# Patient Record
Sex: MALE
[De-identification: names, ages, dates, MRNs, and addresses within clinical notes are randomized per-mention and may not be internally consistent; named-entity substitution may affect disease eponyms.]

---

## 2024-06-14 PROBLEM — Z00.00 ENCOUNTER FOR PREVENTIVE HEALTH EXAMINATION: Status: ACTIVE | Noted: 2024-06-14

## 2024-07-10 ENCOUNTER — NON-APPOINTMENT (OUTPATIENT)
Age: 79
End: 2024-07-10

## 2024-07-11 ENCOUNTER — APPOINTMENT (OUTPATIENT)
Dept: NEUROSURGERY | Facility: CLINIC | Age: 79
End: 2024-07-11

## 2024-07-11 VITALS
WEIGHT: 156 LBS | HEIGHT: 67 IN | OXYGEN SATURATION: 98 % | DIASTOLIC BLOOD PRESSURE: 86 MMHG | BODY MASS INDEX: 24.48 KG/M2 | HEART RATE: 80 BPM | SYSTOLIC BLOOD PRESSURE: 142 MMHG

## 2024-07-11 DIAGNOSIS — M43.16 SPONDYLOLISTHESIS, LUMBAR REGION: ICD-10-CM

## 2024-07-11 DIAGNOSIS — M47.27 OTHER SPONDYLOSIS WITH RADICULOPATHY, LUMBOSACRAL REGION: ICD-10-CM

## 2024-07-11 DIAGNOSIS — M54.12 RADICULOPATHY, CERVICAL REGION: ICD-10-CM

## 2024-07-11 DIAGNOSIS — M54.16 RADICULOPATHY, LUMBAR REGION: ICD-10-CM

## 2024-07-11 DIAGNOSIS — M48.062 SPINAL STENOSIS, LUMBAR REGION WITH NEUROGENIC CLAUDICATION: ICD-10-CM

## 2024-07-11 PROCEDURE — 99205 OFFICE O/P NEW HI 60 MIN: CPT

## 2024-07-12 PROBLEM — M48.062 NEUROGENIC CLAUDICATION DUE TO LUMBAR SPINAL STENOSIS: Status: ACTIVE | Noted: 2024-07-12

## 2024-08-04 NOTE — PHYSICAL EXAM
[General Appearance - Alert] : alert [General Appearance - In No Acute Distress] : in no acute distress [Fluency] : fluency intact [Comprehension] : comprehension intact [Cranial Nerves Optic (II)] : visual acuity intact bilaterally,  pupils equal round and reactive to light [Cranial Nerves Oculomotor (III)] : extraocular motion intact [Cranial Nerves Trigeminal (V)] : facial sensation intact symmetrically [Cranial Nerves Facial (VII)] : face symmetrical [Cranial Nerves Vestibulocochlear (VIII)] : hearing was intact bilaterally [Cranial Nerves Glossopharyngeal (IX)] : tongue and palate midline [Cranial Nerves Hypoglossal (XII)] : there was no tongue deviation with protrusion [Cranial Nerves Accessory (XI - Cranial And Spinal)] : head turning and shoulder shrug symmetric [Motor Strength] : muscle strength was normal in all four extremities [Sensation Tactile Decrease] : light touch was intact [Abnormal Walk] : normal gait [2+] : Ankle jerk left 2+

## 2024-08-08 ENCOUNTER — APPOINTMENT (OUTPATIENT)
Dept: NEUROSURGERY | Facility: CLINIC | Age: 79
End: 2024-08-08

## 2024-08-08 NOTE — HISTORY OF PRESENT ILLNESS
[de-identified] : French Jensen presents for interval follow up and imaging review for lumbar stenosis. He continues to have pain from gluteal area down posteriorly to the knee. He has participated in PT with *** relief.   He underwent CT lumbar spine demonstrating L4-L5 moderate broad based disc herniation with mild to moderate spinal canal stenosis. L5-S1 there is a moderate broad based disc herniation with mild spinal canal stenosis (see detailed report below). He underwent lumbar x-rays with no acute pathology identified.   7/11/24: FRENCH JENSEN is a 78 year old male with a PMH of non obstructive CAD, hypertension, hyperlipidemia, osteoporosis who presents to the office today for neurosurgical consultation due to lumbar stenosis.   The pain is characterized as sharp worse at night.  It started six to eight months ago progressively worse. It is exacerbated by bending and relieved by standing, some medications.  Limits his ADLs including walking to mailbox. It radiates from gluteal area posteriorly down to the knee. Has received injections in knees. Has bakers cyst on knees. There has been no known trauma precipitating the pain.  The patient denies numbness of extremities, weakness of extremities, bowel or bladder incontinence and gait disturbance.  He has tried APRIL (March 14 with Dr. Ricardo Guerra No relief) Aleve 5-6 times per day, and pain medications including Tramadol, Celebrex in the last months without relief.  He has undergone imaging in the form of MRI which revealed severe multilevel degenerative disc and facet joint changes. Disc bulges and marginal osteophytes from L1-L2 to L5-S1 causing central canal stenosis or neural foraminal narrowing. There is no disc herniation in the lumbar spine. Grade 1 degenerative spondylolisthesis at L1-L2 and L4-L5. Levoscoliosis which contributes to neural foraminal narrowing at multiple levels (see detailed report below)  Cardiologist: Dr. DARIEL MCLAIN [facility]  Surg Hx:  splenectomy, cardiac catheterization, Tonsillectomy  Meds:  Aspirin 81 mg, Atorvastatin, Atenolol 100 mg, Gabapentin 600 mg TID, Lisinopril 2.5 mg, MVI, B12  Allergies: NKDA   Soc Hx: smokes marijuana daily, Former smoker quit age 44 1 PPD, social EtOH 3-4 beers per day, lives with wife, worked previously in detail shop and fur industry

## 2024-08-08 NOTE — ASSESSMENT
[FreeTextEntry1] : EDY KERR is a 78 year old male with a PMH of non obstructive CAD, PAD, hypertension, hyperlipidemia who presents to the office today for follow up due to lumbar stenosis.   I reviewed his CT and x-ray lumbar spine in the office today.  Previous and again today, I reviewed his MRI lumbar spine using the spine model to aid in interpretation. He has evidence of central and neuroforaminal stenosis secondary to lumbar spondylosis with degenerative lumbar spondylolisthesis at L4-5.   I have discussed the natural history and treatment options for neurogenic claudication due to lumbar spinal stenosis with the patient. I explained the indications for observation, conservative management, medical management, physical therapy, pain management approaches and surgery. I explained the different types and surgical approaches including anterior and posterior approaches as well as decompression only procedures and instrumented fusions. I discussed the risks, benefits, possible complications and expected outcome related to each treatment option. The risks of surgery were discussed in detail including but not limited to postoperative infection at the surgical site, hospital acquired pneumonia, hospital acquired urinary tract infection, postoperative meningitis, wound dehiscence, CSF leak, stroke (ischemic and hemorrhagic), postoperative seizures, worsening motor function due to spinal cord or nerve injury, postoperative visual deficit which could be permanent (blindness) when surgery is performed in a prone position, cardiovascular complications (MI, PE, DVT) and I also explained that some of these complications could lead to sepsis, coma or even death.  In the end I recommend surgical intervention in the form of ***.  The patient will need cardiac clearance and will need NP clearance with the Presurgical Testing Department at [place] prior to the procedure.  At the end of the discussion, the patient opted to move forward with the above plan.  Our office will reach out to coordinate a surgical date in the coming weeks.  The patient understands the plan of care and is in agreement.  All questions answered to patient satisfaction.  I have spent 45 minutes relative to this encounter, including >50% of this time being with the patient in the office.

## 2024-08-08 NOTE — ASSESSMENT
[FreeTextEntry1] : EDY KERR is a 78 year old male with a PMH of non obstructive CAD, PAD, hypertension, hyperlipidemia who presents to the office today for follow up due to lumbar stenosis.   I reviewed his CT and x-ray lumbar spine in the office today.  Previous and again today, I reviewed his MRI lumbar spine using the spine model to aid in interpretation. He has evidence of central and neuroforaminal stenosis secondary to lumbar spondylosis with degenerative lumbar spondylolisthesis at L4-5.   I have discussed the natural history and treatment options for neurogenic claudication due to lumbar spinal stenosis with the patient. I explained the indications for observation, conservative management, medical management, physical therapy, pain management approaches and surgery. I explained the different types and surgical approaches including anterior and posterior approaches as well as decompression only procedures and instrumented fusions. I discussed the risks, benefits, possible complications and expected outcome related to each treatment option. The risks of surgery were discussed in detail including but not limited to postoperative infection at the surgical site, hospital acquired pneumonia, hospital acquired urinary tract infection, postoperative meningitis, wound dehiscence, CSF leak, stroke (ischemic and hemorrhagic), postoperative seizures, worsening motor function due to spinal cord or nerve injury, postoperative visual deficit which could be permanent (blindness) when surgery is performed in a prone position, cardiovascular complications (MI, PE, DVT) and I also explained that some of these complications could lead to sepsis, coma or even death.  In the end I recommend surgical intervention in the form of ***.  The patient will need cardiac clearance and will need NP clearance with the Presurgical Testing Department at [place] prior to the procedure.  At the end of the discussion, the patient opted to move forward with the above plan.  Our office will reach out to coordinate a surgical date in the coming weeks.  The patient understands the plan of care and is in agreement.  All questions answered to patient satisfaction.  I have spent 45 minutes relative to this encounter, including >50% of this time being with the patient in the office.

## 2024-08-08 NOTE — ASSESSMENT
[FreeTextEntry1] : EDY KERR is a 78 year old male with a PMH of non obstructive CAD, PAD, hypertension, hyperlipidemia who presents to the office today for follow up due to lumbar stenosis.   I reviewed his CT and x-ray lumbar spine in the office today.  Previous and again today, I reviewed his MRI lumbar spine using the spine model to aid in interpretation. He has evidence of central and neuroforaminal stenosis secondary to lumbar spondylosis with degenerative lumbar spondylolisthesis at L4-5.   I have discussed the natural history and treatment options for neurogenic claudication due to lumbar spinal stenosis with the patient. I explained the indications for observation, conservative management, medical management, physical therapy, pain management approaches and surgery. I explained the different types and surgical approaches including anterior and posterior approaches as well as decompression only procedures and instrumented fusions. I discussed the risks, benefits, possible complications and expected outcome related to each treatment option. The risks of surgery were discussed in detail including but not limited to postoperative infection at the surgical site, hospital acquired pneumonia, hospital acquired urinary tract infection, postoperative meningitis, wound dehiscence, CSF leak, stroke (ischemic and hemorrhagic), postoperative seizures, worsening motor function due to spinal cord or nerve injury, postoperative visual deficit which could be permanent (blindness) when surgery is performed in a prone position, cardiovascular complications (MI, PE, DVT) and I also explained that some of these complications could lead to sepsis, coma or even death.  In the end I recommend surgical intervention in the form of ***.  The patient will need cardiac clearance and will need NP clearance with the Presurgical Testing Department at [place] prior to the procedure.  At the end of the discussion, the patient opted to move forward with the above plan.  Our office will reach out to coordinate a surgical date in the coming weeks.  The patient understands the plan of care and is in agreement.  All questions answered to patient satisfaction.  I have spent 45 minutes relative to this encounter, including >50% of this time being with the patient in the office.

## 2024-08-08 NOTE — HISTORY OF PRESENT ILLNESS
[de-identified] : French Jensen presents for interval follow up and imaging review for lumbar stenosis. He continues to have pain from gluteal area down posteriorly to the knee. He has participated in PT with *** relief.   He underwent CT lumbar spine demonstrating L4-L5 moderate broad based disc herniation with mild to moderate spinal canal stenosis. L5-S1 there is a moderate broad based disc herniation with mild spinal canal stenosis (see detailed report below). He underwent lumbar x-rays with no acute pathology identified.   7/11/24: FRENCH JENSEN is a 78 year old male with a PMH of non obstructive CAD, hypertension, hyperlipidemia, osteoporosis who presents to the office today for neurosurgical consultation due to lumbar stenosis.   The pain is characterized as sharp worse at night.  It started six to eight months ago progressively worse. It is exacerbated by bending and relieved by standing, some medications.  Limits his ADLs including walking to mailbox. It radiates from gluteal area posteriorly down to the knee. Has received injections in knees. Has bakers cyst on knees. There has been no known trauma precipitating the pain.  The patient denies numbness of extremities, weakness of extremities, bowel or bladder incontinence and gait disturbance.  He has tried APRIL (March 14 with Dr. Ricardo Guerra No relief) Aleve 5-6 times per day, and pain medications including Tramadol, Celebrex in the last months without relief.  He has undergone imaging in the form of MRI which revealed severe multilevel degenerative disc and facet joint changes. Disc bulges and marginal osteophytes from L1-L2 to L5-S1 causing central canal stenosis or neural foraminal narrowing. There is no disc herniation in the lumbar spine. Grade 1 degenerative spondylolisthesis at L1-L2 and L4-L5. Levoscoliosis which contributes to neural foraminal narrowing at multiple levels (see detailed report below)  Cardiologist: Dr. DARIEL MCLAIN [facility]  Surg Hx:  splenectomy, cardiac catheterization, Tonsillectomy  Meds:  Aspirin 81 mg, Atorvastatin, Atenolol 100 mg, Gabapentin 600 mg TID, Lisinopril 2.5 mg, MVI, B12  Allergies: NKDA   Soc Hx: smokes marijuana daily, Former smoker quit age 44 1 PPD, social EtOH 3-4 beers per day, lives with wife, worked previously in detail shop and fur industry

## 2024-08-08 NOTE — HISTORY OF PRESENT ILLNESS
[de-identified] : French Jensen presents for interval follow up and imaging review for lumbar stenosis. He continues to have pain from gluteal area down posteriorly to the knee. He has participated in PT with *** relief.   He underwent CT lumbar spine demonstrating L4-L5 moderate broad based disc herniation with mild to moderate spinal canal stenosis. L5-S1 there is a moderate broad based disc herniation with mild spinal canal stenosis (see detailed report below). He underwent lumbar x-rays with no acute pathology identified.   7/11/24: FRENCH JENSEN is a 78 year old male with a PMH of non obstructive CAD, hypertension, hyperlipidemia, osteoporosis who presents to the office today for neurosurgical consultation due to lumbar stenosis.   The pain is characterized as sharp worse at night.  It started six to eight months ago progressively worse. It is exacerbated by bending and relieved by standing, some medications.  Limits his ADLs including walking to mailbox. It radiates from gluteal area posteriorly down to the knee. Has received injections in knees. Has bakers cyst on knees. There has been no known trauma precipitating the pain.  The patient denies numbness of extremities, weakness of extremities, bowel or bladder incontinence and gait disturbance.  He has tried APRIL (March 14 with Dr. Ricardo Guerra No relief) Aleve 5-6 times per day, and pain medications including Tramadol, Celebrex in the last months without relief.  He has undergone imaging in the form of MRI which revealed severe multilevel degenerative disc and facet joint changes. Disc bulges and marginal osteophytes from L1-L2 to L5-S1 causing central canal stenosis or neural foraminal narrowing. There is no disc herniation in the lumbar spine. Grade 1 degenerative spondylolisthesis at L1-L2 and L4-L5. Levoscoliosis which contributes to neural foraminal narrowing at multiple levels (see detailed report below)  Cardiologist: Dr. DARIEL MCLAIN [facility]  Surg Hx:  splenectomy, cardiac catheterization, Tonsillectomy  Meds:  Aspirin 81 mg, Atorvastatin, Atenolol 100 mg, Gabapentin 600 mg TID, Lisinopril 2.5 mg, MVI, B12  Allergies: NKDA   Soc Hx: smokes marijuana daily, Former smoker quit age 44 1 PPD, social EtOH 3-4 beers per day, lives with wife, worked previously in detail shop and fur industry

## 2024-08-15 ENCOUNTER — APPOINTMENT (OUTPATIENT)
Dept: NEUROSURGERY | Facility: CLINIC | Age: 79
End: 2024-08-15
Payer: MEDICARE

## 2024-08-15 DIAGNOSIS — M47.27 OTHER SPONDYLOSIS WITH RADICULOPATHY, LUMBOSACRAL REGION: ICD-10-CM

## 2024-08-15 DIAGNOSIS — M48.062 SPINAL STENOSIS, LUMBAR REGION WITH NEUROGENIC CLAUDICATION: ICD-10-CM

## 2024-08-15 DIAGNOSIS — M43.16 SPONDYLOLISTHESIS, LUMBAR REGION: ICD-10-CM

## 2024-08-15 PROCEDURE — 99215 OFFICE O/P EST HI 40 MIN: CPT | Mod: 95

## 2024-08-15 NOTE — HISTORY OF PRESENT ILLNESS
[de-identified] : The patient has given verbal consent for this telehealth visit using two-way audio and video technology.  The patient is currently located at home [address] [place] [address], and I am located at my office at [facility].  French Jensen presents for interval follow up and imaging review for lumbar stenosis. He continues to have pain from gluteal area down posteriorly to the knee. He has participated in PT with minimal relief. No new or worsening symptoms  He underwent CT lumbar spine and dynamic standing lumbar spine X-rays (see detailed report below).  7/11/24: FRENCH JENSEN is a 78 year old male with a PMH of non obstructive CAD, hypertension, hyperlipidemia, osteoporosis who presents to the office today for neurosurgical consultation due to lumbar stenosis.   The pain is characterized as sharp worse at night.  It started six to eight months ago progressively worse. It is exacerbated by bending and relieved by standing, some medications.  Limits his ADLs including walking to mailbox. It radiates from gluteal area posteriorly down to the knee. Has received injections in knees. Has bakers cyst on knees. There has been no known trauma precipitating the pain.  The patient denies numbness of extremities, weakness of extremities, bowel or bladder incontinence and gait disturbance.  He has tried APRIL (March 14 with Dr. Ricardo Guerra No relief) Aleve 5-6 times per day, and pain medications including Tramadol, Celebrex in the last months without relief.  He has undergone imaging in the form of MRI which revealed severe multilevel degenerative disc and facet joint changes. Disc bulges and marginal osteophytes from L1-L2 to L5-S1 causing central canal stenosis or neural foraminal narrowing. There is no disc herniation in the lumbar spine. Grade 1 degenerative spondylolisthesis at L1-L2 and L4-L5. Levoscoliosis which contributes to neural foraminal narrowing at multiple levels (see detailed report below)  Cardiologist: Dr. 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

## 2024-08-15 NOTE — ASSESSMENT
[FreeTextEntry1] : EDY KERR is a 78 year old male with a PMH of non obstructive CAD, PAD, hypertension, hyperlipidemia who presents to the office today for follow up due to lumbar stenosis.  He continues to have pain in his legs and hip with spasms and inability to lift more than 10 pounds. He feels significantly affected by this and his quality of life is altered due to the symptoms.   I reviewed his CT and x-ray lumbar spine in the office today. His CT demonstrates an L4-5 spondylolisthesis (grade 1) with bilateral facet arthropathy. There is facet overgrowth leading to partial auto-fusion. The X-rays demonstrate no dynamic instability.   Previous and again today, I reviewed his MRI lumbar spine using the spine model to aid in interpretation. He has evidence of central and neuroforaminal stenosis secondary to lumbar spondylosis with degenerative lumbar spondylolisthesis at L4-5, and to a lesser extent at L3-4.   I have discussed the natural history and treatment options for neurogenic claudication due to lumbar spinal stenosis with the patient. I explained the indications for observation, conservative management, medical management, physical therapy, pain management approaches and surgery. I explained the different types and surgical approaches including anterior and posterior approaches as well as decompression only procedures and instrumented fusions. I discussed the risks, benefits, possible complications and expected outcome related to each treatment option. The risks of surgery were discussed in detail including but not limited to postoperative infection at the surgical site, hospital acquired pneumonia, hospital acquired urinary tract infection, postoperative meningitis, wound dehiscence, CSF leak, stroke (ischemic and hemorrhagic), postoperative seizures, worsening motor function due to spinal cord or nerve injury, postoperative visual deficit which could be permanent (blindness) when surgery is performed in a prone position, cardiovascular complications (MI, PE, DVT) and I also explained that some of these complications could lead to sepsis, coma or even death.  In the end I recommend surgical intervention in the form of L3-4 and L4-5 laminectomies, medial facetectomies, and bilateral foraminotomies. The patient will need cardiac clearance and will need NP clearance with the Presurgical Testing Department at [place] prior to the procedure.  At the end of the discussion, the patient opted to move forward with the above plan.  Our office will reach out to coordinate a surgical date in the coming weeks.  The patient understands the plan of care and is in agreement.  All questions answered to patient satisfaction.  I have spent 45 minutes relative to this encounter, including >50% of this time being with the patient in the office.

## 2024-08-15 NOTE — ASSESSMENT
[FreeTextEntry1] : EDY KERR is a 78 year old male with a PMH of non obstructive CAD, PAD, hypertension, hyperlipidemia who presents to the office today for follow up due to lumbar stenosis.  He continues to have pain in his legs and hip with spasms and inability to lift more than 10 pounds. He feels significantly affected by this and his quality of life is altered due to the symptoms.   I reviewed his CT and x-ray lumbar spine in the office today. His CT demonstrates an L4-5 spondylolisthesis (grade 1) with bilateral facet arthropathy. There is facet overgrowth leading to partial auto-fusion. The X-rays demonstrate no dynamic instability.   Previous and again today, I reviewed his MRI lumbar spine using the spine model to aid in interpretation. He has evidence of central and neuroforaminal stenosis secondary to lumbar spondylosis with degenerative lumbar spondylolisthesis at L4-5, and to a lesser extent at L3-4.   I have discussed the natural history and treatment options for neurogenic claudication due to lumbar spinal stenosis with the patient. I explained the indications for observation, conservative management, medical management, physical therapy, pain management approaches and surgery. I explained the different types and surgical approaches including anterior and posterior approaches as well as decompression only procedures and instrumented fusions. I discussed the risks, benefits, possible complications and expected outcome related to each treatment option. The risks of surgery were discussed in detail including but not limited to postoperative infection at the surgical site, hospital acquired pneumonia, hospital acquired urinary tract infection, postoperative meningitis, wound dehiscence, CSF leak, stroke (ischemic and hemorrhagic), postoperative seizures, worsening motor function due to spinal cord or nerve injury, postoperative visual deficit which could be permanent (blindness) when surgery is performed in a prone position, cardiovascular complications (MI, PE, DVT) and I also explained that some of these complications could lead to sepsis, coma or even death.  In the end I recommend surgical intervention in the form of L3-4 and L4-5 laminectomies, medial facetectomies, and bilateral foraminotomies. The patient will need cardiac clearance and will need NP clearance with the Presurgical Testing Department at [place] prior to the procedure.  At the end of the discussion, the patient opted to move forward with the above plan.  Our office will reach out to coordinate a surgical date in the coming weeks.  The patient understands the plan of care and is in agreement.  All questions answered to patient satisfaction.  I have spent 45 minutes relative to this encounter, including >50% of this time being with the patient in the office.

## 2024-08-15 NOTE — HISTORY OF PRESENT ILLNESS
[de-identified] : The patient has given verbal consent for this telehealth visit using two-way audio and video technology.  The patient is currently located at home [address] [place] [address], and I am located at my office at [facility].  French Jensen presents for interval follow up and imaging review for lumbar stenosis. He continues to have pain from gluteal area down posteriorly to the knee. He has participated in PT with minimal relief. No new or worsening symptoms  He underwent CT lumbar spine and dynamic standing lumbar spine X-rays (see detailed report below).  7/11/24: FRENCH JENSEN is a 78 year old male with a PMH of non obstructive CAD, hypertension, hyperlipidemia, osteoporosis who presents to the office today for neurosurgical consultation due to lumbar stenosis.   The pain is characterized as sharp worse at night.  It started six to eight months ago progressively worse. It is exacerbated by bending and relieved by standing, some medications.  Limits his ADLs including walking to mailbox. It radiates from gluteal area posteriorly down to the knee. Has received injections in knees. Has bakers cyst on knees. There has been no known trauma precipitating the pain.  The patient denies numbness of extremities, weakness of extremities, bowel or bladder incontinence and gait disturbance.  He has tried APRIL (March 14 with Dr. Ricardo Guerra No relief) Aleve 5-6 times per day, and pain medications including Tramadol, Celebrex in the last months without relief.  He has undergone imaging in the form of MRI which revealed severe multilevel degenerative disc and facet joint changes. Disc bulges and marginal osteophytes from L1-L2 to L5-S1 causing central canal stenosis or neural foraminal narrowing. There is no disc herniation in the lumbar spine. Grade 1 degenerative spondylolisthesis at L1-L2 and L4-L5. Levoscoliosis which contributes to neural foraminal narrowing at multiple levels (see detailed report below)  Cardiologist: Dr. 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

## 2024-08-22 ENCOUNTER — RESULT REVIEW (OUTPATIENT)
Age: 79
End: 2024-08-22

## 2024-08-27 ENCOUNTER — APPOINTMENT (OUTPATIENT)
Dept: HEMATOLOGY ONCOLOGY | Facility: CLINIC | Age: 79
End: 2024-08-27
Payer: MEDICARE

## 2024-08-27 VITALS
WEIGHT: 162.2 LBS | RESPIRATION RATE: 16 BRPM | HEART RATE: 51 BPM | TEMPERATURE: 98.5 F | DIASTOLIC BLOOD PRESSURE: 87 MMHG | HEIGHT: 67 IN | SYSTOLIC BLOOD PRESSURE: 156 MMHG | BODY MASS INDEX: 25.46 KG/M2 | OXYGEN SATURATION: 96 %

## 2024-08-27 DIAGNOSIS — Z86.39 PERSONAL HISTORY OF OTHER ENDOCRINE, NUTRITIONAL AND METABOLIC DISEASE: ICD-10-CM

## 2024-08-27 DIAGNOSIS — Z86.79 PERSONAL HISTORY OF OTHER DISEASES OF THE CIRCULATORY SYSTEM: ICD-10-CM

## 2024-08-27 DIAGNOSIS — R79.1 ABNORMAL COAGULATION PROFILE: ICD-10-CM

## 2024-08-27 PROCEDURE — G2211 COMPLEX E/M VISIT ADD ON: CPT

## 2024-08-27 PROCEDURE — 99205 OFFICE O/P NEW HI 60 MIN: CPT

## 2024-08-27 RX ORDER — PNV NO.95/FERROUS FUM/FOLIC AC 28MG-0.8MG
TABLET ORAL
Refills: 0 | Status: ACTIVE | COMMUNITY

## 2024-08-27 RX ORDER — ASPIRIN 81 MG
81 TABLET, DELAYED RELEASE (ENTERIC COATED) ORAL
Refills: 0 | Status: ACTIVE | COMMUNITY

## 2024-08-27 RX ORDER — LISINOPRIL 2.5 MG/1
2.5 TABLET ORAL
Refills: 0 | Status: ACTIVE | COMMUNITY

## 2024-08-27 RX ORDER — MULTIVITAMIN/IRON/FOLIC ACID 18MG-0.4MG
TABLET ORAL
Refills: 0 | Status: ACTIVE | COMMUNITY

## 2024-08-27 RX ORDER — ATORVASTATIN CALCIUM 80 MG/1
80 TABLET, FILM COATED ORAL
Refills: 0 | Status: ACTIVE | COMMUNITY

## 2024-08-27 RX ORDER — GABAPENTIN 600 MG/1
600 TABLET, COATED ORAL
Refills: 0 | Status: ACTIVE | COMMUNITY

## 2024-08-27 RX ORDER — ATENOLOL 100 MG/1
100 TABLET ORAL
Refills: 0 | Status: ACTIVE | COMMUNITY

## 2024-08-27 NOTE — HISTORY OF PRESENT ILLNESS
[de-identified] : Mr. Jensen is a 78 y.o male who was referred for prolonged PT.  He has an upcoming procedure for his back on 9.5.24 and was noted on pre-op labs to have a PT/INR of 17.2/1.6. aPTT normal  Denies any personal hx of bleeding complications. 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 There were no reported incidents of excessive rapid bleeding or inability to stop bleeding from surgical procedures in his past medical history.  Currently, he has been taking aspirin regularly for last few months, but otherwise denies any anticoagulation.   No family hx of bleeding or clotting disorders [ECOG Performance Status: 0 - Fully active, able to carry on all pre-disease performance without restriction] : Performance Status: 0 - Fully active, able to carry on all pre-disease performance without restriction

## 2024-08-28 ENCOUNTER — NON-APPOINTMENT (OUTPATIENT)
Age: 79
End: 2024-08-28

## 2024-09-05 ENCOUNTER — APPOINTMENT (OUTPATIENT)
Dept: NEUROSURGERY | Facility: HOSPITAL | Age: 79
End: 2024-09-05

## 2024-09-06 ENCOUNTER — TRANSCRIPTION ENCOUNTER (OUTPATIENT)
Age: 79
End: 2024-09-06

## 2024-09-18 ENCOUNTER — APPOINTMENT (OUTPATIENT)
Dept: NEUROSURGERY | Facility: CLINIC | Age: 79
End: 2024-09-18
Payer: MEDICARE

## 2024-09-18 DIAGNOSIS — M48.062 SPINAL STENOSIS, LUMBAR REGION WITH NEUROGENIC CLAUDICATION: ICD-10-CM

## 2024-09-18 DIAGNOSIS — M47.27 OTHER SPONDYLOSIS WITH RADICULOPATHY, LUMBOSACRAL REGION: ICD-10-CM

## 2024-09-18 PROCEDURE — 99024 POSTOP FOLLOW-UP VISIT: CPT

## 2024-09-18 NOTE — ASSESSMENT
[FreeTextEntry1] : EDY KERR is a 78 year old male s/p L3-L5 Laminectomy  medial facetectomies and bilateral foraminotomies on 9/5/24.  Prior to surgery, he was having pain in his legs and hip with spasms and inability to lift more than 10 pounds.  He is now able to ambulate better, sleep better, and is overall feeling great after surgery.   He has been doing well since his surgery on 9/5/24. He was instructed to continue using fragrance-free shampoo to clean incision twice daily. He should continue the current pain regimen. He would like to wean himself off of Gabapentin, however I encouraged him to remain on the same dose at this time. In 2 weeks he can try switching to just once a day (at night) to see if he is able to tolerate it.  I have provided a prescription for outpatient PT.  He should return to the office in four weeks time (6 weeks post-op) for a progress check, TEB is okay.  The patient understands the plan of care and is in agreement with it.  I have spent 30 minutes relative to this encounter.

## 2024-09-18 NOTE — REASON FOR VISIT
[de-identified] : L3-L5 Laminectomy  medial facetectomies and bilateral foraminotomies [de-identified] : 9/5/24  [de-identified] : 15 no

## 2024-09-18 NOTE — PHYSICAL EXAM
[General Appearance - Alert] : alert [General Appearance - In No Acute Distress] : in no acute distress [Longitudinal] : longitudinal [Clean] : clean [Dry] : dry [Intact] : intact [No Drainage] : without drainage [Fluency] : fluency intact [Comprehension] : comprehension intact [Cranial Nerves Optic (II)] : visual acuity intact bilaterally,  pupils equal round and reactive to light [Cranial Nerves Oculomotor (III)] : extraocular motion intact [Cranial Nerves Trigeminal (V)] : facial sensation intact symmetrically [Cranial Nerves Facial (VII)] : face symmetrical [Cranial Nerves Vestibulocochlear (VIII)] : hearing was intact bilaterally [Cranial Nerves Glossopharyngeal (IX)] : tongue and palate midline [Cranial Nerves Accessory (XI - Cranial And Spinal)] : head turning and shoulder shrug symmetric [Cranial Nerves Hypoglossal (XII)] : there was no tongue deviation with protrusion [Motor Strength] : muscle strength was normal in all four extremities [Sensation Tactile Decrease] : light touch was intact [Abnormal Walk] : normal gait [Normal] : normal [FreeTextEntry1] : lower back

## 2024-09-18 NOTE — REASON FOR VISIT
[de-identified] : L3-L5 Laminectomy  medial facetectomies and bilateral foraminotomies [de-identified] : 9/5/24  [de-identified] : 15

## 2024-09-18 NOTE — HISTORY OF PRESENT ILLNESS
[de-identified] : French Jensen presents s/p L3-L5 Laminectomy  medial facetectomies and bilateral foraminotomies on 9/5/24. He reports the gluteal pain down the knee has greatly improved since surgery. He is able to walk up and down stairs easier and is sleeping better.  The patient denies new pain, numbness, tingling, weakness, bowel/bladder dysfunction, gait disturbance, fever, chills, drainage from incision, redness at incision site.  7/11/24: The patient has given verbal consent for this telehealth visit using two-way audio and video technology.  The patient is currently located at home [address] [place] [address], and I am located at my office at [facility].  French Jensen presents for interval follow up and imaging review for lumbar stenosis. He continues to have pain from gluteal area down posteriorly to the knee. He has participated in PT with minimal relief. No new or worsening symptoms  He underwent CT lumbar spine and dynamic standing lumbar spine X-rays (see detailed report below).  7/11/24: FRENCH JENSEN is a 78 year old male with a PMH of non obstructive CAD, hypertension, hyperlipidemia, osteoporosis who presents to the office today for neurosurgical consultation due to lumbar stenosis.   The pain is characterized as sharp worse at night.  It started six to eight months ago progressively worse. It is exacerbated by bending and relieved by standing, some medications.  Limits his ADLs including walking to mailbox. It radiates from gluteal area posteriorly down to the knee. Has received injections in knees. Has bakers cyst on knees. There has been no known trauma precipitating the pain.  The patient denies numbness of extremities, weakness of extremities, bowel or bladder incontinence and gait disturbance.  He has tried APRIL (March 14 with Dr. Ricardo Guerra No relief) Aleve 5-6 times per day, and pain medications including Tramadol, Celebrex in the last months without relief.  He has undergone imaging in the form of MRI which revealed severe multilevel degenerative disc and facet joint changes. Disc bulges and marginal osteophytes from L1-L2 to L5-S1 causing central canal stenosis or neural foraminal narrowing. There is no disc herniation in the lumbar spine. Grade 1 degenerative spondylolisthesis at L1-L2 and L4-L5. Levoscoliosis which contributes to neural foraminal narrowing at multiple levels (see detailed report below)  Cardiologist: Dr. DARIEL MCLAIN [facility] Hx:  splenectomy, cardiac catheterization, Tonsillectomy  Meds:  Aspirin 81 mg, Atorvastatin, Atenolol 100 mg, Gabapentin 600 mg TID, Lisinopril 2.5 mg, MVI, B12  Allergies: NKDA   Soc Hx: smokes marijuana daily, Former smoker quit age 44 1 PPD, social EtOH 3-4 beers per day, lives with wife, worked previously in detail shop and fur industry

## 2024-09-18 NOTE — HISTORY OF PRESENT ILLNESS
[de-identified] : French Jensen presents s/p L3-L5 Laminectomy  medial facetectomies and bilateral foraminotomies on 9/5/24. He reports the gluteal pain down the knee has greatly improved since surgery. He is able to walk up and down stairs easier and is sleeping better.  The patient denies new pain, numbness, tingling, weakness, bowel/bladder dysfunction, gait disturbance, fever, chills, drainage from incision, redness at incision site.  7/11/24: The patient has given verbal consent for this telehealth visit using two-way audio and video technology.  The patient is currently located at home [address] [place] [address], and I am located at my office at [facility].  French Jenesn presents for interval follow up and imaging review for lumbar stenosis. He continues to have pain from gluteal area down posteriorly to the knee. He has participated in PT with minimal relief. No new or worsening symptoms  He underwent CT lumbar spine and dynamic standing lumbar spine X-rays (see detailed report below).  7/11/24: FRENCH JENSEN is a 78 year old male with a PMH of non obstructive CAD, hypertension, hyperlipidemia, osteoporosis who presents to the office today for neurosurgical consultation due to lumbar stenosis.   The pain is characterized as sharp worse at night.  It started six to eight months ago progressively worse. It is exacerbated by bending and relieved by standing, some medications.  Limits his ADLs including walking to mailbox. It radiates from gluteal area posteriorly down to the knee. Has received injections in knees. Has bakers cyst on knees. There has been no known trauma precipitating the pain.  The patient denies numbness of extremities, weakness of extremities, bowel or bladder incontinence and gait disturbance.  He has tried APRIL (March 14 with Dr. Ricardo Guerra No relief) Aleve 5-6 times per day, and pain medications including Tramadol, Celebrex in the last months without relief.  He has undergone imaging in the form of MRI which revealed severe multilevel degenerative disc and facet joint changes. Disc bulges and marginal osteophytes from L1-L2 to L5-S1 causing central canal stenosis or neural foraminal narrowing. There is no disc herniation in the lumbar spine. Grade 1 degenerative spondylolisthesis at L1-L2 and L4-L5. Levoscoliosis which contributes to neural foraminal narrowing at multiple levels (see detailed report below)  Cardiologist: Dr. DARIEL MCLAIN [facility]  [facility]urg Hx:  splenectomy, cardiac catheterization, Tonsillectomy  Meds:  Aspirin 81 mg, Atorvastatin, Atenolol 100 mg, Gabapentin 600 mg TID, Lisinopril 2.5 mg, MVI, B12  Allergies: NKDA   Soc Hx: smokes marijuana daily, Former smoker quit age 44 1 PPD, social EtOH 3-4 beers per day, lives with wife, worked previously in detail shop and fur industry